# Patient Record
Sex: FEMALE | Race: WHITE | URBAN - METROPOLITAN AREA
[De-identification: names, ages, dates, MRNs, and addresses within clinical notes are randomized per-mention and may not be internally consistent; named-entity substitution may affect disease eponyms.]

---

## 2018-09-19 ENCOUNTER — HISTORICAL (OUTPATIENT)
Dept: ADMINISTRATIVE | Facility: HOSPITAL | Age: 83
End: 2018-09-19

## 2022-04-30 NOTE — OP NOTE
Patient:   Lani Esqueda            MRN: [id number]            FIN: [id number]               Age:   83 years     Sex:  Female     :  1935   Associated Diagnoses:   None   Author:   Edu Thomas MD      Preoperative Diagnosis: Cataract Left eye    Postoperative Diagnosis: Cataract Left eye    Procedure: Phacoemulsification with intaocular lens implantations Left eye    Surgeon: Edu Thomas MD    Assistant: MILKA Jones     Anestheisa: Topical    Complications: None    The patient was brought into the operating suite, where the patient was correctly identified as was the operative eye via timeout.  The patient was prepped and draped in a sterile ophthalmic fashion.  A lid speculum was placed in the operative eye and the microscope was brought into place.  A 1.0mm paracentesis was then made at 6 o'clock.  The anterior chamber was filled with Endocoat.  A temporal clear corneal incision was made with a 2.4 mm keratome.  A 6 mm corneal marking ring was used to reid the cornea centered over the visual axis.  A 5.00 mm continuous curvilinear capsulorhexis was fashioned using a cystotome and microcapsular forceps.  Hydrodissection and hydrodelineation was performed with upreserved 1% Xylocaine.  The nucleus was then phacoemulsified with the Abbott phacoemulsification hand-piece with a total of 3 EFX.  The cortex was then removed with the Dimitry I/A hand-piece. An PRITI lens model ZCB00 with a power of 21.5 was placed in the capsular bag.  The Helon was then removed from the eye with the Dimitry I/A hand piece.  The anterior chamber was inflated and the wounds were hydrated with BSS.  The wounds were checked with Weck-Irena sponges and found to be watertight.  The lid speculum was removed and topical antibiotics were placed on the operative eye.  The patient was brought to PACU in good condition.        2018 @ [facility]